# Patient Record
Sex: FEMALE | Race: WHITE | NOT HISPANIC OR LATINO | Employment: OTHER | URBAN - METROPOLITAN AREA
[De-identification: names, ages, dates, MRNs, and addresses within clinical notes are randomized per-mention and may not be internally consistent; named-entity substitution may affect disease eponyms.]

---

## 2017-02-02 ENCOUNTER — OFFICE VISIT (OUTPATIENT)
Dept: NEUROSURGERY | Facility: CLINIC | Age: 57
End: 2017-02-02

## 2017-02-02 VITALS
HEIGHT: 66 IN | SYSTOLIC BLOOD PRESSURE: 100 MMHG | TEMPERATURE: 97.7 F | BODY MASS INDEX: 21.05 KG/M2 | WEIGHT: 131 LBS | DIASTOLIC BLOOD PRESSURE: 60 MMHG

## 2017-02-02 DIAGNOSIS — G56.22 ULNAR NEUROPATHY OF LEFT UPPER EXTREMITY: Primary | ICD-10-CM

## 2017-02-02 PROCEDURE — 99214 OFFICE O/P EST MOD 30 MIN: CPT | Performed by: NEUROLOGICAL SURGERY

## 2017-02-02 NOTE — PATIENT INSTRUCTIONS
Call Dr. Maria on a Monday or Tuesday, and leave a message with Gloria ().   Dr. Maria will call you back at the end of the day as soon as he can.

## 2017-02-02 NOTE — PROGRESS NOTES
Jolanta CURTIS Mic  1960  [id number]      Chief Complaint   Patient presents with   • Arm Pain       HISTORY OF PRESENT ILLNESS:   This is a 56-year-old female who has a protracted history of pain in her left elbow unassociated with paresthesia in the distribution of the ulnar nerve.  She has pain when she drives her car when she holds an object in the elbow.  Pain also and numbness awakens her at night.  She has not had recent studies although in 2014 she had a EMG/NCV which was normal.  At that time I saw her for cervical issues.  She responded well to physical therapy.  The issues which she has at this time however are different in character and distribution.    Past Medical History   Diagnosis Date   • Anxiety    • B12 deficiency    • Blurred vision    • Difficulty swallowing solids    • Dyslipidemia    • Headache    • Hearing loss    • Hypotension, unspecified    • Insomnia    • Malaise and fatigue    • Multiple sprains    • Neck mass    • Sciatica    • Skin cancer    • Vitamin D deficiency        Past Surgical History   Procedure Laterality Date   • Mandible fracture surgery     • Mandible surgery     • Breast augmentation     • Tonsillectomy     • Augmentation mammaplasty  1998       Family History   Problem Relation Age of Onset   • Cancer Mother    • Diabetes Mother    • Heart disease Father        Social History     Social History   • Marital status:      Spouse name: N/A   • Number of children: N/A   • Years of education: N/A     Occupational History   • Not on file.     Social History Main Topics   • Smoking status: Never Smoker   • Smokeless tobacco: Never Used   • Alcohol use No   • Drug use: No   • Sexual activity: Defer     Other Topics Concern   • Not on file     Social History Narrative       Allergies   Allergen Reactions   • Codeine Nausea And Vomiting and Nausea Only         Current Outpatient Prescriptions:   •  diclofenac (VOLTAREN) 50 MG EC tablet, Take 1 tablet by mouth 3 (three)  times a day., Disp: 20 tablet, Rfl: 0  •  gabapentin (NEURONTIN) 300 MG capsule, Take 300 mg by mouth 3 (three) times a day., Disp: , Rfl:   •  HYDROcodone-acetaminophen (NORCO) 5-325 MG per tablet, Take 1 tablet by mouth every 6 (six) hours as needed., Disp: , Rfl:   •  LORazepam (ATIVAN) 0.5 MG tablet, Take 0.5 mg by mouth every 8 (eight) hours as needed for anxiety., Disp: , Rfl:   •  meloxicam (MOBIC) 7.5 MG tablet, Take 7.5 mg by mouth 2 (two) times a day., Disp: , Rfl:   •  ondansetron ODT (ZOFRAN-ODT) 4 MG disintegrating tablet, Take 4 mg by mouth every 8 (eight) hours as needed for nausea or vomiting., Disp: , Rfl:   •  promethazine (PHENERGAN) 25 MG tablet, Take 25 mg by mouth every 6 (six) hours as needed for nausea or vomiting., Disp: , Rfl:   •  SUMAtriptan (IMITREX) 50 MG tablet, Take one tablet at onset of headache. May repeat dose one time in 2 hours if headache not relieved., Disp: 10 tablet, Rfl: 0  •  zolpidem (AMBIEN) 5 MG tablet, Take 5 mg by mouth at night as needed for sleep., Disp: , Rfl:     Review of Systems   Constitutional: Negative for activity change, appetite change, chills, diaphoresis, fatigue, fever and unexpected weight change.   HENT: Negative for congestion, dental problem, drooling, ear discharge, ear pain, facial swelling, hearing loss, mouth sores, nosebleeds, postnasal drip, rhinorrhea, sinus pressure, sneezing, sore throat, tinnitus, trouble swallowing and voice change.    Eyes: Negative for photophobia, pain, discharge, redness, itching and visual disturbance.   Respiratory: Negative for apnea, cough, choking, chest tightness, shortness of breath, wheezing and stridor.    Cardiovascular: Negative for chest pain, palpitations and leg swelling.   Gastrointestinal: Negative for abdominal distention, abdominal pain, anal bleeding, blood in stool, constipation, diarrhea, nausea, rectal pain and vomiting.   Endocrine: Negative for cold intolerance, heat intolerance, polydipsia,  "polyphagia and polyuria.   Genitourinary: Negative for decreased urine volume, difficulty urinating, dysuria, enuresis, flank pain, frequency, genital sores, hematuria and urgency.   Musculoskeletal: Negative for arthralgias, back pain, gait problem, joint swelling, myalgias, neck pain and neck stiffness.   Skin: Negative for color change, pallor, rash and wound.   Allergic/Immunologic: Negative for environmental allergies, food allergies and immunocompromised state.   Neurological: Negative for dizziness, tremors, seizures, syncope, facial asymmetry, speech difficulty, weakness, light-headedness, numbness and headaches.   Hematological: Negative for adenopathy. Does not bruise/bleed easily.   Psychiatric/Behavioral: Negative for agitation, behavioral problems, confusion, decreased concentration, dysphoric mood, hallucinations, self-injury, sleep disturbance and suicidal ideas. The patient is not nervous/anxious and is not hyperactive.    All other systems reviewed and are negative.      Neurological Examination:    Vitals:    02/02/17 1635   BP: 100/60   BP Location: Right arm   Patient Position: Standing   Cuff Size: Adult   Temp: 97.7 °F (36.5 °C)   TempSrc: Temporal Artery    Weight: 131 lb (59.4 kg)   Height: 66\" (167.6 cm)       Mental status/speech: The patient is alert and oriented.  Speech is clear without aphysia or dysarthria.  No overt cognitive deficits.    Cranial nerve examination:    Olfaction: Smell is intact.  Vision: Vision is intact without visual field abnormalities.  Funduscopic examination is normal.  No pupillary irregularity.  Ocular motor examination: The extraocular muscles are intact.  There is no diplopia.  The pupil is round and reactive to both light and accommodation.  There is no nystagmus.  Facial movement/sensation: There is no facial weakness.  Sensation is intact in the first, second, and third divisions of the trigeminal nerve.  The corneal reflex is intact.  Auditory: Hearing " is intact to finger rub bilaterally.  Cranial nerves IX, X, XI, XII: Phonation is normal.  No dysphagia.  Tongue is protruded in the midline without atrophy.  The gag reflex is intact.  Shoulder shrug is normal.    Musculoligamentous ligamentous examination: She has full active range of motion of cervical spine.  She has a positive Tinel at the left elbow.  She has slight atrophy of the interosseous on the left side.  She has no weakness.  Her gait is normal.    Medical Decision Making:     Diagnostic Data Set:  She has had no recent diagnostic studies      Assessment:  Ulnar neuropathy          Recommendations:   I have ordered EMG/NCV of her left upper extremity and I've asked her to see the hand orthopedist in [place].  Whereas she may well have an ulnar neuropathy I'm concerned is since she has so much pain on the medial and lateral epicondyles of her elbow.  If she is to have surgery both may be approached at a single setting.  I'll me after her evaluation.        I greatly appreciate the opportunity to see and evaluate this individual.  If you have questions or concerns regarding issues that I may have overlooked please call me at any time: [phone].  Graham Maria M.D.  [facility]  [address].  [place] [address]    Scribed for Omid Maria MD by Matthew Paula CMA. 2/2/2017  5:05 PM     I have read and concur with the information provided by the scribe.  Omid Maria MD

## 2017-02-28 ENCOUNTER — HOSPITAL ENCOUNTER (OUTPATIENT)
Dept: NEUROLOGY | Facility: HOSPITAL | Age: 57
Discharge: HOME | End: 2017-02-28
Attending: NEUROLOGICAL SURGERY | Admitting: NEUROLOGICAL SURGERY

## 2017-02-28 ENCOUNTER — TELEPHONE (OUTPATIENT)
Dept: NEUROSURGERY | Facility: CLINIC | Age: 57
End: 2017-02-28

## 2017-02-28 PROCEDURE — 95886 MUSC TEST DONE W/N TEST COMP: CPT

## 2017-02-28 PROCEDURE — 95908 NRV CNDJ TST 3-4 STUDIES: CPT

## 2017-02-28 NOTE — TELEPHONE ENCOUNTER
----- Message from Gloria KYMBERLY Puente sent at 2/28/2017  4:41 PM EST -----  Contact: [phone]  PATIENT CALLING TO REPORT RESULTS OF OFFICE VISITS WITH DR. MONTEIRO AND DR. JOHN.  DR. MONTEIRO ULNAR NEUROPATHY IN LEFT ELBOW MILD TO MODERATE, MEDIAN NEUROPATHY AT LEFT WRIST IS MILD.  NO RADICULOPATHY.      SHE WAS GIVEN A WRIST SPLINT AND AN ELBOW EXTENSION BRACE.      PATIENT IS IN EPIC.

## 2017-03-02 ENCOUNTER — APPOINTMENT (OUTPATIENT)
Dept: NEUROLOGY | Facility: HOSPITAL | Age: 57
End: 2017-03-02
Attending: NEUROLOGICAL SURGERY

## 2017-04-06 ENCOUNTER — TELEPHONE (OUTPATIENT)
Dept: NEUROSURGERY | Facility: CLINIC | Age: 57
End: 2017-04-06

## 2017-04-06 NOTE — TELEPHONE ENCOUNTER
----- Message from Gloria Puente sent at 4/5/2017  1:57 PM EDT -----  Contact: [phone]  PATIENT CALLING TO REPORT ON HER CONDITION.  LAST SEEN IN February.  HAS BEEN WEARING BRACES AND IT HAS CALMED HER ELBOW, BUT HER LEFT WRIST HAS PAIN THAT GOES UP HER ARM AND PAIN IN HER FINGERS.    GOING TO [place] AND [place] IN [place].      PATIENT IS IN EPIC.

## 2017-05-18 ENCOUNTER — TELEPHONE (OUTPATIENT)
Dept: NEUROSURGERY | Facility: CLINIC | Age: 57
End: 2017-05-18

## 2017-08-02 ENCOUNTER — TRANSCRIBE ORDERS (OUTPATIENT)
Dept: PHYSICAL THERAPY | Facility: HOSPITAL | Age: 57
End: 2017-08-02

## 2017-08-02 ENCOUNTER — HOSPITAL ENCOUNTER (OUTPATIENT)
Dept: PHYSICAL THERAPY | Facility: HOSPITAL | Age: 57
Setting detail: THERAPIES SERIES
Discharge: HOME | End: 2017-08-02

## 2017-08-02 DIAGNOSIS — M25.552 CHRONIC HIP PAIN, LEFT: Primary | ICD-10-CM

## 2017-08-02 DIAGNOSIS — G89.29 CHRONIC HIP PAIN, LEFT: Primary | ICD-10-CM

## 2017-08-02 DIAGNOSIS — M25.552 LEFT HIP PAIN: Primary | ICD-10-CM

## 2017-08-02 PROCEDURE — G8978 MOBILITY CURRENT STATUS: HCPCS

## 2017-08-02 PROCEDURE — 97163 PT EVAL HIGH COMPLEX 45 MIN: CPT

## 2017-08-02 PROCEDURE — G8979 MOBILITY GOAL STATUS: HCPCS

## 2017-08-08 ENCOUNTER — HOSPITAL ENCOUNTER (OUTPATIENT)
Dept: PHYSICAL THERAPY | Facility: HOSPITAL | Age: 57
Setting detail: THERAPIES SERIES
Discharge: HOME | End: 2017-08-08
Attending: INTERNAL MEDICINE

## 2017-08-08 DIAGNOSIS — G89.29 CHRONIC HIP PAIN, LEFT: Primary | ICD-10-CM

## 2017-08-08 DIAGNOSIS — M25.552 CHRONIC HIP PAIN, LEFT: Primary | ICD-10-CM

## 2017-08-08 PROCEDURE — G0283 ELEC STIM OTHER THAN WOUND: HCPCS

## 2017-08-08 PROCEDURE — 97110 THERAPEUTIC EXERCISES: CPT

## 2017-08-08 PROCEDURE — 97035 APP MDLTY 1+ULTRASOUND EA 15: CPT

## 2017-08-08 NOTE — THERAPY TREATMENT NOTE
Outpatient Physical Therapy Ortho Treatment Note  ANDREA Shrestha     Patient Name: Jolanta Haile  : 1960  MRN: [id number]  Today's Date: 2017      Visit Date: 2017    Visit Dx:    ICD-10-CM ICD-9-CM   1. Chronic hip pain, left M25.552 719.45    G89.29 338.29       Patient Active Problem List   Diagnosis   • Pain of left breast   • Visit for wound check        Past Medical History:   Diagnosis Date   • Anxiety    • B12 deficiency    • Blurred vision    • Difficulty swallowing solids    • Dyslipidemia    • Headache    • Hearing loss    • Hypotension, unspecified    • Insomnia    • Malaise and fatigue    • Multiple sprains    • Neck mass    • Sciatica    • Skin cancer    • Vitamin D deficiency         Past Surgical History:   Procedure Laterality Date   • AUGMENTATION MAMMAPLASTY     • BREAST AUGMENTATION     • MANDIBLE FRACTURE SURGERY     • MANDIBLE SURGERY     • TONSILLECTOMY                               PT Assessment/Plan       17 1228       PT Assessment    Assessment Comments Tx consisted of mh and estim to back followed by ther ex and ended with US to left hip flexor.  Pt reported increased  pain with most left hip exercises and reported similar post pain scores.  -RT     PT Plan    PT Plan Comments Will follow for decreased hip pain.  -RT       User Key  (r) = Recorded By, (t) = Taken By, (c) = Cosigned By    Initials Name Provider Type    RT Alexsander Littlejohn, PT Physical Therapist                Modalities       17 1100          Subjective Comments    Subjective Comments Pt reports having increased left hip flexor and lbp today.  Pt reports performing her HEP.  -RT      Subjective Pain    Able to rate subjective pain? yes  -RT      Pre-Treatment Pain Level 7  -RT      Post-Treatment Pain Level 6  -RT      Moist Heat    MH Applied Yes  -RT      Location low back and lefft hip  -RT      Rx Minutes 10 mins  -RT      MH Prior to Rx Yes  -RT      Ultrasound 56761    Location left  hip flexor  -RT      Rx Minutes 10min  -RT      Duty Cycle 50  -RT      Frequency --   3.3  -RT      Intensity - Wts/cm 1.2  -RT      ELECTRICAL STIMULATION    Attended/Unattended Unattended  -RT      Stimulation Type Pre-Mod  -RT      Location/Electrode Placement/Other L5-S1  -RT      Rx Minutes 10 mins  -RT        User Key  (r) = Recorded By, (t) = Taken By, (c) = Cosigned By    Initials Name Provider Type    RT Alexsander Littlejohn PT Physical Therapist                Exercises       08/08/17 1100          Subjective Comments    Subjective Comments Pt reports having increased left hip flexor and lbp today.  Pt reports performing her HEP.  -RT      Subjective Pain    Able to rate subjective pain? yes  -RT      Pre-Treatment Pain Level 7  -RT      Post-Treatment Pain Level 6  -RT      Exercise 1    Exercise Name 1 ltr x30, ppe x10, hip flexor stretch manual, piriformis s [id number], ham s 20sec x3, dktc 20s x3, scap squeeze x20, slr x10, ball squeeze x20, saq x20, ppt x10, hip abd rtb x10,   -RT      Cueing 1 Verbal  -RT      Time (Seconds) 1 30  -RT        User Key  (r) = Recorded By, (t) = Taken By, (c) = Cosigned By    Initials Name Provider Type    RT Alexsander Littlejohn PT Physical Therapist                                   Therapy Education       08/08/17 1228          Therapy Education    Given HEP;Symptoms/condition management;Pain management;Posture/body mechanics  -RT      Program Reinforced  -RT      How Provided Verbal;Demonstration  -RT      Provided to Patient  -RT      Level of Understanding Teach back education performed;Verbalized;Demonstrated  -RT        User Key  (r) = Recorded By, (t) = Taken By, (c) = Cosigned By    Initials Name Provider Type    RT Alexsander Littlejohn PT Physical Therapist                Time Calculation:   Start Time: 1000  Stop Time: 1058  Time Calculation (min): 58 min    Therapy Charges for Today     Code Description Service Date Service Provider Modifiers Qty    03794992067  PT  THER PROC EA 15 MIN 8/8/2017 Alexsander Littlejohn, PT GP 2    95015168894 HC PT ELECTRICAL STIM UNATTENDED 8/8/2017 Alexsander Littlejohn, PT  1    45501141156 HC PT ULTRASOUND EA 15 MIN 8/8/2017 Alexsander Littlejohn, PT GP 1                    Alexsander Littlejohn, PT  8/8/2017

## 2017-08-11 ENCOUNTER — HOSPITAL ENCOUNTER (OUTPATIENT)
Dept: PHYSICAL THERAPY | Facility: HOSPITAL | Age: 57
Setting detail: THERAPIES SERIES
Discharge: HOME | End: 2017-08-11
Attending: INTERNAL MEDICINE

## 2017-08-11 DIAGNOSIS — G89.29 CHRONIC HIP PAIN, LEFT: Primary | ICD-10-CM

## 2017-08-11 DIAGNOSIS — M25.552 CHRONIC HIP PAIN, LEFT: Primary | ICD-10-CM

## 2017-08-11 PROCEDURE — 97110 THERAPEUTIC EXERCISES: CPT

## 2017-08-11 PROCEDURE — G0283 ELEC STIM OTHER THAN WOUND: HCPCS

## 2017-08-11 PROCEDURE — 97140 MANUAL THERAPY 1/> REGIONS: CPT

## 2017-08-11 NOTE — THERAPY TREATMENT NOTE
Outpatient Physical Therapy Ortho Treatment Note   Fady     Patient Name: Jolanta Haile  : 1960  MRN: [id number]  Today's Date: 2017      Visit Date: 2017    Visit Dx:    ICD-10-CM ICD-9-CM   1. Chronic hip pain, left M25.552 719.45    G89.29 338.29       Patient Active Problem List   Diagnosis   • Pain of left breast   • Visit for wound check        Past Medical History:   Diagnosis Date   • Anxiety    • B12 deficiency    • Blurred vision    • Difficulty swallowing solids    • Dyslipidemia    • Headache    • Hearing loss    • Hypotension, unspecified    • Insomnia    • Malaise and fatigue    • Multiple sprains    • Neck mass    • Sciatica    • Skin cancer    • Vitamin D deficiency         Past Surgical History:   Procedure Laterality Date   • AUGMENTATION MAMMAPLASTY     • BREAST AUGMENTATION     • MANDIBLE FRACTURE SURGERY     • MANDIBLE SURGERY     • TONSILLECTOMY               PT Ortho       17 0900    Subjective Comments    Subjective Comments Patient reports she is sore this morning from performing her stretches, 8/10 pain prior to tx.   -FOUZIA    Subjective Pain    Able to rate subjective pain? yes  -FOUZIA    Pre-Treatment Pain Level 8  -FOUZIA      User Key  (r) = Recorded By, (t) = Taken By, (c) = Cosigned By    Initials Name Provider Type    FOUZIA Mercado PTA Physical Therapy Assistant                            PT Assessment/Plan       17 1153       PT Assessment    Assessment Comments Patient presents to therapy w/ reports of increased soreness secondary performing stretches at home.  Pt initiated today's session w/ MH and Estim for pain control f/b therex as per written flow sheet.  Treatment concluded with manual STM to low back and L) hip musculature to address tightness and for pain control, as to pt's tolerance.  Tenderness noted along L) greater trochanter region.  Pt received cues throughout session improved feedback and for max benefit w/ activities.   "Pt will be progressed as tolerated, w/ no adverse reactions observed following modalities/ manual therapy.    -FOUZIA     PT Plan    PT Plan Comments Continue with PT's POC and progress tx as tolerated by patient.   -FOUZIA       User Key  (r) = Recorded By, (t) = Taken By, (c) = Cosigned By    Initials Name Provider Type    FOUZIA Mercado PTA Physical Therapy Assistant                Modalities       08/11/17 0900          Moist Heat    MH Applied Yes  -FOUZIA      Location low back and lefft hip  -FOUZIA      Rx Minutes 10 mins  -FOUZIA      MH Prior to Rx Yes   w/ estim in supine position  -FOUZIA      ELECTRICAL STIMULATION    Attended/Unattended Unattended  -FOUZIA      Stimulation Type Pre-Mod  -FOUZIA      Max mAmp --   as to pt's tolerance  -FOUZIA      Location/Electrode Placement/Other L5-S1  -FOUZIA      Rx Minutes 10 mins  -FOUZIA        User Key  (r) = Recorded By, (t) = Taken By, (c) = Cosigned By    Initials Name Provider Type    FOUZIA Mercado PTA Physical Therapy Assistant                Exercises       08/11/17 0900          Subjective Comments    Subjective Comments Patient reports she is sore this morning from performing her stretches, 8/10 pain prior to tx.   -FOUZIA      Subjective Pain    Able to rate subjective pain? yes  -FOUZIA      Pre-Treatment Pain Level 8  -FOUZIA      Post-Treatment Pain Level 6  -FOUZIA      Exercise 1    Exercise Name 1 LTR 15x2, PPT 15x2, piriformis stretch 3x20\", DKTC 3x20\", ball squeeze 15x2, hip abd w/tband (red) 10x2, SAQ 15x2, seated march x10, scap squeeze 10x2  -FOUZIA      Cueing 1 Verbal;Tactile;Demo  -FOUZIA      Time (Minutes) 1 30 min  -FOUZIA        User Key  (r) = Recorded By, (t) = Taken By, (c) = Cosigned By    Initials Name Provider Type    FOUZIA Mercado PTA Physical Therapy Assistant                        Manual Rx (last 36 hours)      Manual Treatments       08/11/17 1100          Manual Rx 1    Manual Rx 1 Location lumbar/ L) hip musculature  -FOUZIA      Manual Rx 1 Type STM  -FOUZIA      " Manual Rx 1 Grade gentle, as to pt's tolerance  -FOUZIA      Manual Rx 1 Duration 10 min  -FOUZIA        User Key  (r) = Recorded By, (t) = Taken By, (c) = Cosigned By    Initials Name Provider Type    FOUZIA Mercado PTA Physical Therapy Assistant                    Therapy Education       08/11/17 0905          Therapy Education    Given HEP;Symptoms/condition management;Pain management;Posture/body mechanics  -FOUZIA      Program Reinforced  -FOUZIA      How Provided Verbal;Demonstration  -FOUZIA      Provided to Patient  -FOUZIA      Level of Understanding Verbalized;Demonstrated  -FOUZIA        User Key  (r) = Recorded By, (t) = Taken By, (c) = Cosigned By    Initials Name Provider Type    FOUZIA Mercado PTA Physical Therapy Assistant                Time Calculation:   Start Time: 0855  Stop Time: 0950  Time Calculation (min): 55 min    Therapy Charges for Today     Code Description Service Date Service Provider Modifiers Qty    99922792732 HC PT THER PROC EA 15 MIN 8/11/2017 Misty Mercado PTA GP 2    36360681222 HC PT MANUAL THERAPY EA 15 MIN 8/11/2017 Misty Mercado PTA GP 1    29612180651 HC PT ELECTRICAL STIM UNATTENDED 8/11/2017 Misty Mercado PTA  1                    Misty Mercado PTA  8/11/2017

## 2017-08-15 ENCOUNTER — HOSPITAL ENCOUNTER (OUTPATIENT)
Dept: PHYSICAL THERAPY | Facility: HOSPITAL | Age: 57
Setting detail: THERAPIES SERIES
Discharge: HOME | End: 2017-08-15
Attending: INTERNAL MEDICINE

## 2017-08-15 DIAGNOSIS — G89.29 CHRONIC HIP PAIN, LEFT: Primary | ICD-10-CM

## 2017-08-15 DIAGNOSIS — M25.552 CHRONIC HIP PAIN, LEFT: Primary | ICD-10-CM

## 2017-08-15 PROCEDURE — G0283 ELEC STIM OTHER THAN WOUND: HCPCS

## 2017-08-15 PROCEDURE — 97110 THERAPEUTIC EXERCISES: CPT

## 2017-08-15 PROCEDURE — 97140 MANUAL THERAPY 1/> REGIONS: CPT

## 2017-08-15 NOTE — THERAPY TREATMENT NOTE
Outpatient Physical Therapy Ortho Treatment Note  ANDREA Shrestha     Patient Name: Jolanta Haile  : 1960  MRN: [id number]  Today's Date: 8/15/2017      Visit Date: 08/15/2017    Visit Dx:    ICD-10-CM ICD-9-CM   1. Chronic hip pain, left M25.552 719.45    G89.29 338.29       Patient Active Problem List   Diagnosis   • Pain of left breast   • Visit for wound check        Past Medical History:   Diagnosis Date   • Anxiety    • B12 deficiency    • Blurred vision    • Difficulty swallowing solids    • Dyslipidemia    • Headache    • Hearing loss    • Hypotension, unspecified    • Insomnia    • Malaise and fatigue    • Multiple sprains    • Neck mass    • Sciatica    • Skin cancer    • Vitamin D deficiency         Past Surgical History:   Procedure Laterality Date   • AUGMENTATION MAMMAPLASTY     • BREAST AUGMENTATION     • MANDIBLE FRACTURE SURGERY     • MANDIBLE SURGERY     • TONSILLECTOMY               PT Ortho       08/15/17 1000    Subjective Comments    Subjective Comments Patient states that she has been working on her exercises at home and walking as well. Patient reports that she continues to have pain the L) hip and low back.  -AC    Subjective Pain    Able to rate subjective pain? yes  -AC    Pre-Treatment Pain Level 6  -AC    Post-Treatment Pain Level 6  -AC      User Key  (r) = Recorded By, (t) = Taken By, (c) = Cosigned By    Initials Name Provider Type    CLAUDINE Garza PTA Physical Therapy Assistant                            PT Assessment/Plan       08/15/17 1030       PT Assessment    Assessment Comments Patient tolerated treatment session well with rest breaks taken as needed by the patient. Educated patient to perform ther ex per her tolerance, patient verbalized understanding. STM perform to the L) hip/ lumbar musculature to help decrease tightness in the low back musculature. No adverse reactions with modalities or treatment. Decrease in pain noted following treatment.   -AC   "   PT Plan    PT Plan Comments Continue per PT's POC, progress per the patient's tolerance.  -AC       User Key  (r) = Recorded By, (t) = Taken By, (c) = Cosigned By    Initials Name Provider Type    CLAUDINE Garza PTA Physical Therapy Assistant                Modalities       08/15/17 1000          Moist Heat    MH Applied Yes   No redness noted following moist heat  -AC      Location low back and left hip  -AC      Rx Minutes 15 mins  -AC      MH Prior to Rx Yes  -AC      ELECTRICAL STIMULATION    Attended/Unattended Unattended   No irritation noted following estim  -AC      Stimulation Type Pre-Mod  -AC      Max mAmp --   per the patient's tolerance  -AC      Location/Electrode Placement/Other L5-S1  -AC      Rx Minutes 15 mins  -AC        User Key  (r) = Recorded By, (t) = Taken By, (c) = Cosigned By    Initials Name Provider Type    CLAUDINE Garza PTA Physical Therapy Assistant                Exercises       08/15/17 1000          Subjective Comments    Subjective Comments Patient states that she has been working on her exercises at home and walking as well. Patient reports that she continues to have pain the L) hip and low back.  -AC      Subjective Pain    Able to rate subjective pain? yes  -AC      Pre-Treatment Pain Level 6  -AC      Post-Treatment Pain Level 6  -AC      Exercise 1    Exercise Name 1 LTR 15x2, PPT 15x2, piriformis stretch 3x20\", DKTC 3x20\", ball squeeze 15x2, hip abd w/tband (red) 10x2, SAQ 15x2, seated march x10, scap squeeze 10x2  -AC      Cueing 1 Verbal;Tactile;Demo  -AC      Time (Minutes) 1 30 minutes  -AC        User Key  (r) = Recorded By, (t) = Taken By, (c) = Cosigned By    Initials Name Provider Type    CLAUDINE Garza PTA Physical Therapy Assistant                        Manual Rx (last 36 hours)      Manual Treatments       08/15/17 0900          Manual Rx 1    Manual Rx 1 Location lumbar/ L) hip musculature  -AC      Manual Rx 1 Type STM  -AC   "    Manual Rx 1 Grade gentle, as to pt's tolerance  -AC      Manual Rx 1 Duration 10 min  -AC        User Key  (r) = Recorded By, (t) = Taken By, (c) = Cosigned By    Initials Name Provider Type    CLAUDINE Garza PTA Physical Therapy Assistant                    Therapy Education       08/15/17 1030          Therapy Education    Given HEP;Symptoms/condition management;Pain management  -AC      Program Reinforced  -AC      How Provided Verbal;Demonstration  -AC      Provided to Patient  -AC      Level of Understanding Verbalized;Demonstrated  -AC        User Key  (r) = Recorded By, (t) = Taken By, (c) = Cosigned By    Initials Name Provider Type    CLAUDINE Garza PTA Physical Therapy Assistant                Time Calculation:   Start Time: 0905  Stop Time: 1005  Time Calculation (min): 60 min    Therapy Charges for Today     Code Description Service Date Service Provider Modifiers Qty    60798853221 HC PT THER PROC EA 15 MIN 8/15/2017 Jen Garza PTA GP 2    28396388228 HC PT MANUAL THERAPY EA 15 MIN 8/15/2017 Jen Garza PTA GP 1    37012355768 HC PT ELECTRICAL STIM UNATTENDED 8/15/2017 Jen Garza PTA  1    26343450463 HC PT THER SUPP EA 15 MIN 8/15/2017 Jen Garza PTA GP 1                    Jen Garza PTA  8/15/2017

## 2017-08-18 ENCOUNTER — HOSPITAL ENCOUNTER (OUTPATIENT)
Dept: PHYSICAL THERAPY | Facility: HOSPITAL | Age: 57
Setting detail: THERAPIES SERIES
Discharge: HOME | End: 2017-08-18
Attending: INTERNAL MEDICINE

## 2017-08-18 DIAGNOSIS — M25.552 CHRONIC HIP PAIN, LEFT: Primary | ICD-10-CM

## 2017-08-18 DIAGNOSIS — G89.29 CHRONIC HIP PAIN, LEFT: Primary | ICD-10-CM

## 2017-08-18 PROCEDURE — 97140 MANUAL THERAPY 1/> REGIONS: CPT

## 2017-08-18 PROCEDURE — G0283 ELEC STIM OTHER THAN WOUND: HCPCS

## 2017-08-18 PROCEDURE — 97110 THERAPEUTIC EXERCISES: CPT

## 2017-08-18 NOTE — THERAPY TREATMENT NOTE
Outpatient Physical Therapy Ortho Treatment Note  ANDREA Shrestha     Patient Name: Jolanta Haile  : 1960  MRN: [id number]  Today's Date: 2017      Visit Date: 2017    Visit Dx:    ICD-10-CM ICD-9-CM   1. Chronic hip pain, left M25.552 719.45    G89.29 338.29       Patient Active Problem List   Diagnosis   • Pain of left breast   • Visit for wound check        Past Medical History:   Diagnosis Date   • Anxiety    • B12 deficiency    • Blurred vision    • Difficulty swallowing solids    • Dyslipidemia    • Headache    • Hearing loss    • Hypotension, unspecified    • Insomnia    • Malaise and fatigue    • Multiple sprains    • Neck mass    • Sciatica    • Skin cancer    • Vitamin D deficiency         Past Surgical History:   Procedure Laterality Date   • AUGMENTATION MAMMAPLASTY     • BREAST AUGMENTATION     • MANDIBLE FRACTURE SURGERY     • MANDIBLE SURGERY     • TONSILLECTOMY               PT Ortho       17 1000    Subjective Comments    Subjective Comments Patient reports that she is getting stronger and is able to walk better. Patient states that she has been working on her home exercises and walking more.   -AC    Subjective Pain    Able to rate subjective pain? yes  -AC    Pre-Treatment Pain Level 5  -AC    Post-Treatment Pain Level 4  -AC      User Key  (r) = Recorded By, (t) = Taken By, (c) = Cosigned By    Initials Name Provider Type    CLAUDINE Garza, PTA Physical Therapy Assistant                            PT Assessment/Plan       17 1015       PT Assessment    Assessment Comments New ther ex added per the patient's tolerance, patient demonstrated and understood new ther ex with no increase in pain noted. Patient tolerated treatment session well with rest breaks taken as needed by the patient. Reps increased per the patient's tolerance with no increase in pain. Educated patient to perform ther ex per her tolerance, patient verbalized understanding. Patient  "educated on performing bridges x10 at home per her tolerance, educated patient to discontinue exercises if increased pain or irritation occurs, patient verbalized understanding. No adverse reactions with modalities or treatment. Decrease in pain noted following treatment session to 4/10. STM performed to help decrease tightness in the L) hip and low back musculature.  -AC     PT Plan    PT Plan Comments Continue per PT's POC, progress per the patient's tolerance.  -AC       User Key  (r) = Recorded By, (t) = Taken By, (c) = Cosigned By    Initials Name Provider Type    CLAUDINE aGrza PTA Physical Therapy Assistant                Modalities       08/18/17 1000          Moist Heat    MH Applied Yes   No redness noted following moist heat  -AC      Location low back and left hip   with estim  -AC      Rx Minutes 15 mins  -AC      MH Prior to Rx Yes  -AC      ELECTRICAL STIMULATION    Attended/Unattended Unattended   No irritation noted following estim  -AC      Stimulation Type Pre-Mod  -AC      Max mAmp --   per the patient's tolerance  -AC      Location/Electrode Placement/Other L5-S1  -AC      Rx Minutes 15 mins  -AC        User Key  (r) = Recorded By, (t) = Taken By, (c) = Cosigned By    Initials Name Provider Type    CLAUDINE Garza PTA Physical Therapy Assistant                Exercises       08/18/17 1000          Subjective Comments    Subjective Comments Patient reports that she is getting stronger and is able to walker better. Patient states that she has been working on her home exercises and walking more.   -AC      Subjective Pain    Able to rate subjective pain? yes  -AC      Pre-Treatment Pain Level 5  -AC      Post-Treatment Pain Level 4  -AC      Exercise 1    Exercise Name 1 LTR 15x2, PPT 15x2, piriformis stretch 3x20\", DKTC 3x20\", ball squeeze 15x2, hip abd w/tband (red) 10x2, SAQ 15x2, seated march x15, scap squeeze 10x2, SLR x15, bridge x10  -AC      Cueing 1 Verbal;Tactile;Demo "  -AC      Time (Minutes) 1 30 minutes  -AC        User Key  (r) = Recorded By, (t) = Taken By, (c) = Cosigned By    Initials Name Provider Type     Jen Garza PTA Physical Therapy Assistant                        Manual Rx (last 36 hours)      Manual Treatments       08/18/17 0900          Manual Rx 1    Manual Rx 1 Location lumbar/ L) hip musculature  -AC      Manual Rx 1 Type STM  -AC      Manual Rx 1 Grade gentle, as to pt's tolerance  -AC      Manual Rx 1 Duration 10 min  -AC        User Key  (r) = Recorded By, (t) = Taken By, (c) = Cosigned By    Initials Name Provider Type     Jen Garza PTA Physical Therapy Assistant                    Therapy Education       08/18/17 1015          Therapy Education    Given HEP;Symptoms/condition management;Pain management;Posture/body mechanics  -AC      Program Progressed  -AC      How Provided Verbal;Demonstration  -AC      Provided to Patient  -AC      Level of Understanding Verbalized;Demonstrated  -AC        User Key  (r) = Recorded By, (t) = Taken By, (c) = Cosigned By    Initials Name Provider Type     Jen Garza PTA Physical Therapy Assistant                Time Calculation:   Start Time: 0900  Stop Time: 1005  Time Calculation (min): 65 min    Therapy Charges for Today     Code Description Service Date Service Provider Modifiers Qty    24029384579 HC PT THER PROC EA 15 MIN 8/18/2017 Jen Garza PTA GP 2    26749944744 HC PT ELECTRICAL STIM UNATTENDED 8/18/2017 Jen Garza PTA  1    57510362451 HC PT MANUAL THERAPY EA 15 MIN 8/18/2017 Jen Garza PTA GP 1    83147043625 HC PT THER SUPP EA 15 MIN 8/18/2017 Jen Garza PTA GP 1                    Jen Garza PTA  8/18/2017

## 2017-08-22 ENCOUNTER — HOSPITAL ENCOUNTER (OUTPATIENT)
Dept: PHYSICAL THERAPY | Facility: HOSPITAL | Age: 57
Setting detail: THERAPIES SERIES
Discharge: HOME | End: 2017-08-22
Attending: INTERNAL MEDICINE

## 2017-08-22 DIAGNOSIS — M25.552 CHRONIC HIP PAIN, LEFT: Primary | ICD-10-CM

## 2017-08-22 DIAGNOSIS — G89.29 CHRONIC HIP PAIN, LEFT: Primary | ICD-10-CM

## 2017-08-22 PROCEDURE — 97110 THERAPEUTIC EXERCISES: CPT | Performed by: PHYSICAL THERAPIST

## 2017-08-22 PROCEDURE — G0283 ELEC STIM OTHER THAN WOUND: HCPCS | Performed by: PHYSICAL THERAPIST

## 2017-08-22 NOTE — THERAPY DISCHARGE NOTE
Outpatient Physical Therapy Ortho Treatment Note/Discharge Summary  ANDREA Shrestha     Patient Name: Jolanta Halie  : 1960  MRN: [id number]  Today's Date: 2017      Visit Date: 2017    Visit Dx:    ICD-10-CM ICD-9-CM   1. Chronic hip pain, left M25.552 719.45    G89.29 338.29       Patient Active Problem List   Diagnosis   • Pain of left breast   • Visit for wound check        Past Medical History:   Diagnosis Date   • Anxiety    • B12 deficiency    • Blurred vision    • Difficulty swallowing solids    • Dyslipidemia    • Headache    • Hearing loss    • Hypotension, unspecified    • Insomnia    • Malaise and fatigue    • Multiple sprains    • Neck mass    • Sciatica    • Skin cancer    • Vitamin D deficiency         Past Surgical History:   Procedure Laterality Date   • AUGMENTATION MAMMAPLASTY     • BREAST AUGMENTATION     • MANDIBLE FRACTURE SURGERY     • MANDIBLE SURGERY     • TONSILLECTOMY               PT Ortho       17 1000    Subjective Comments    Subjective Comments Patient reports that her hip pain has improved with therapy.  She notes that she performs her HEP almost daily.  -BE    Subjective Pain    Able to rate subjective pain? yes  -BE    Pre-Treatment Pain Level 8  -BE    Post-Treatment Pain Level 7  -BE    Myotomal Screen- Lower Quarter Clearing    Hip flexion (L2) Bilateral:;4- (Good -)  -BE    Knee extension (L3) Bilateral:;4- (Good -)  -BE    Knee flexion (S2) Left:;4- (Good -);Right:;4 (Good)  -BE      User Key  (r) = Recorded By, (t) = Taken By, (c) = Cosigned By    Initials Name Provider Type    BE Ann Marie Trammell, PT Physical Therapist                                Modalities       17 1000          Moist Heat    MH Applied Yes   No redness following MH  -BE      Location low back and left hip  -BE      Rx Minutes 15 mins  -BE      MH Prior to Rx Yes   with ESTIM in supine position  -BE      ELECTRICAL STIMULATION    Attended/Unattended Unattended   No  "skin irritation following ESTIM  -BE      Stimulation Type Pre-Mod  -BE      Max mAmp --   per patient's tolerance  -BE      Location/Electrode Placement/Other L5-S1  -BE      Rx Minutes 15 mins  -BE        User Key  (r) = Recorded By, (t) = Taken By, (c) = Cosigned By    Initials Name Provider Type    BE Ann Marie Trammell, PT Physical Therapist                Exercises       08/22/17 1000          Subjective Comments    Subjective Comments Patient reports that her hip pain has improved with therapy.  She notes that she performs her HEP almost daily.  -BE      Subjective Pain    Able to rate subjective pain? yes  -BE      Pre-Treatment Pain Level 8  -BE      Post-Treatment Pain Level 7  -BE      Exercise 1    Exercise Name 1 LTR 15x2, PPT 15x2, piriformis stretch 3x20\", SKTC 3x20\",  DKTC 3x20\", scap squeeze 10x2, SLR x15, bridge x10  -BE      Cueing 1 Verbal;Tactile;Demo  -BE      Time (Minutes) 1 25 min  -BE        User Key  (r) = Recorded By, (t) = Taken By, (c) = Cosigned By    Initials Name Provider Type    BE Ann Marie Trammell, PT Physical Therapist                               PT OP Goals       08/22/17 1000       PT Short Term Goals    STG 1 Pt will be instructed in a HEP.  -BE     STG 1 Progress Met  -BE     STG 2 Pt will demonstrate bilateral LE groos strength 4-/5 or greater.  -BE     STG 2 Progress Met  -BE     STG 3 Pt will be able to sit and stand for 10min without increased pain.  -BE     STG 3 Progress Met  -BE     Long Term Goals    LTG 1 Pt will report worst pain to be no greater than 7/10.  -BE     LTG 1 Progress Not Met  -BE     LTG 2 Pt will be instructed on a LE and core stabilization program.  -BE     LTG 2 Progress Met  -BE     LTG 3 Pt will improve her LEFS to 35 or greater.  -BE     LTG 3 Progress Not Met  -BE       User Key  (r) = Recorded By, (t) = Taken By, (c) = Cosigned By    Initials Name Provider Type    BE Ann Marie Trammell, PT Physical Therapist                Therapy Education  "      08/22/17 1037          Therapy Education    Given HEP;Symptoms/condition management;Pain management;Posture/body mechanics  -BE      Program Reinforced  -BE      How Provided Verbal;Demonstration;Written  -BE      Provided to Patient  -BE      Level of Understanding Demonstrated;Verbalized  -BE        User Key  (r) = Recorded By, (t) = Taken By, (c) = Cosigned By    Initials Name Provider Type    BE Ann Marie Trammell, PT Physical Therapist                Outcome Measures       08/22/17 1000          Lower Extremity Functional Index    Any of your usual work, housework or school activities 1  -BE      Your usual hobbies, recreational or sporting activities 1  -BE      Getting into or out of the bath 3  -BE      Walking between rooms 3  -BE      Putting on your shoes or socks 3  -BE      Squatting 2  -BE      Lifting an object, like a bag of groceries from the floor 2  -BE      Performing light activities around your home 2  -BE      Performing heavy activities around your home 0  -BE      Getting into or out of a car 3  -BE      Walking 2 blocks 3  -BE      Walking a mile 3  -BE      Going up or down 10 stairs (about 1 flight of stairs) 2  -BE      Standing for 1 hour 0  -BE      Sitting for 1 hour 0  -BE      Running on even ground 0  -BE      Running on uneven ground 0  -BE      Making sharp turns while running fast 0  -BE      Hopping 0  -BE      Rolling over in bed 2  -BE      Total 30  -BE      Functional Assessment    Outcome Measure Options Lower Extremity Functional Scale (LEFS)  -BE        User Key  (r) = Recorded By, (t) = Taken By, (c) = Cosigned By    Initials Name Provider Type    BE Ann Marie Trammell, PT Physical Therapist            Time Calculation:   Start Time: 0905  Stop Time: 0952  Time Calculation (min): 47 min    Therapy Charges for Today     Code Description Service Date Service Provider Modifiers Qty    40783727885  PT ELECTRICAL STIM UNATTENDED 8/22/2017 Ann Marie Trammell, PT  1     [id number]  PT THER PROC EA 15 MIN 8/22/2017 Ann Marie Trammell, PT GP 2          PT G-Codes  Outcome Measure Options: Lower Extremity Functional Scale (LEFS)     OP PT Discharge Summary  Date of Discharge: 08/22/17  Reason for Discharge: Maximum functional potential achieved  Outcomes Achieved: Refer to plan of care for updates on goals achieved  Discharge Destination: Home with home program (Encouraged to follow-up with referring physician.)  Discharge Instructions: Patient to be discharged, due to achieving maximum level of function at this time.  Patient has improved in LE strength, but continues to report elevated pain levels.  She notes 5/10 pain at best and 9/10 pain at worst.  She reports that standing/sitting tolerance has improved to a 15 minute tolerance.  Patient reports improved functional mobility, with LEFS score improving to 30/80.  She was provided with a HEP while attending therapy and reports compliance with HEP.  Patient is encouraged to continue with HEP following discharge from PT.  Patient was recommended to follow-up with referring physician regarding continued elevated pain reports and notes that she has an appointment on 8/31.      Ann Marie Trammell, PT  8/22/2017

## 2017-09-01 ENCOUNTER — APPOINTMENT (OUTPATIENT)
Dept: PHYSICAL THERAPY | Facility: HOSPITAL | Age: 57
End: 2017-09-01
Attending: INTERNAL MEDICINE

## 2020-03-25 ENCOUNTER — HOSPITAL ENCOUNTER (OUTPATIENT)
Dept: MAMMOGRAPHY | Facility: HOSPITAL | Age: 60
End: 2020-03-25

## 2020-09-24 ENCOUNTER — APPOINTMENT (OUTPATIENT)
Dept: BONE DENSITY | Facility: HOSPITAL | Age: 60
End: 2020-09-24

## 2020-09-24 ENCOUNTER — APPOINTMENT (OUTPATIENT)
Dept: MAMMOGRAPHY | Facility: HOSPITAL | Age: 60
End: 2020-09-24

## 2020-10-08 ENCOUNTER — TRANSCRIBE ORDERS (OUTPATIENT)
Dept: OTHER | Facility: OTHER | Age: 60
End: 2020-10-08

## 2020-10-08 ENCOUNTER — HOSPITAL ENCOUNTER (OUTPATIENT)
Dept: GENERAL RADIOLOGY | Facility: HOSPITAL | Age: 60
Discharge: HOME | End: 2020-10-08
Admitting: PHYSICIAN ASSISTANT

## 2020-10-08 DIAGNOSIS — M25.561 ACUTE PAIN OF RIGHT KNEE: ICD-10-CM

## 2020-10-08 DIAGNOSIS — M25.561 ACUTE PAIN OF RIGHT KNEE: Primary | ICD-10-CM

## 2020-10-08 PROCEDURE — 73562 X-RAY EXAM OF KNEE 3: CPT

## 2020-10-08 PROCEDURE — 73562 X-RAY EXAM OF KNEE 3: CPT | Performed by: RADIOLOGY

## 2020-11-17 ENCOUNTER — LAB (OUTPATIENT)
Dept: LAB | Facility: HOSPITAL | Age: 60
End: 2020-11-17

## 2020-11-17 ENCOUNTER — TRANSCRIBE ORDERS (OUTPATIENT)
Dept: OTHER | Facility: OTHER | Age: 60
End: 2020-11-17

## 2020-11-17 DIAGNOSIS — Z20.828 EXPOSURE TO SARS-ASSOCIATED CORONAVIRUS: Primary | ICD-10-CM

## 2020-11-18 ENCOUNTER — LAB (OUTPATIENT)
Dept: LAB | Facility: HOSPITAL | Age: 60
End: 2020-11-18

## 2020-11-18 PROCEDURE — C9803 HOPD COVID-19 SPEC COLLECT: HCPCS

## 2020-11-18 PROCEDURE — U0004 COV-19 TEST NON-CDC HGH THRU: HCPCS | Performed by: PHYSICIAN ASSISTANT

## 2020-11-19 LAB — SARS-COV-2 RNA RESP QL NAA+PROBE: NOT DETECTED

## 2021-01-12 ENCOUNTER — IMMUNIZATION (OUTPATIENT)
Dept: VACCINE CLINIC | Facility: HOSPITAL | Age: 61
End: 2021-01-12

## 2021-01-12 PROCEDURE — 91300 HC SARSCOV02 VAC 30MCG/0.3ML IM: CPT | Performed by: FAMILY MEDICINE

## 2021-01-12 PROCEDURE — 0001A: CPT | Performed by: FAMILY MEDICINE

## 2021-01-14 ENCOUNTER — APPOINTMENT (OUTPATIENT)
Dept: VACCINE CLINIC | Facility: HOSPITAL | Age: 61
End: 2021-01-14

## 2021-01-21 NOTE — THERAPY EVALUATION
Outpatient Physical Therapy Ortho Initial Evaluation  ANDREA Shrestha     Patient Name: Jolanta Haile  : 1960  MRN: [id number]  Today's Date: 2017      Visit Date: 2017    Patient Active Problem List   Diagnosis   • Pain of left breast   • Visit for wound check        Past Medical History:   Diagnosis Date   • Anxiety    • B12 deficiency    • Blurred vision    • Difficulty swallowing solids    • Dyslipidemia    • Headache    • Hearing loss    • Hypotension, unspecified    • Insomnia    • Malaise and fatigue    • Multiple sprains    • Neck mass    • Sciatica    • Skin cancer    • Vitamin D deficiency         Past Surgical History:   Procedure Laterality Date   • AUGMENTATION MAMMAPLASTY     • BREAST AUGMENTATION     • MANDIBLE FRACTURE SURGERY     • MANDIBLE SURGERY     • TONSILLECTOMY         Visit Dx:     ICD-10-CM ICD-9-CM   1. Chronic hip pain, left M25.552 719.45    G89.29 338.29             Patient History       17 0800          History    Chief Complaint Difficulty Walking;Difficulty with daily activities;Falls/history of falls;Joint stiffness;Muscle tenderness;Muscle weakness;Pain  -RT      Type of Pain Hip pain;Back pain   left  -RT      Date Current Problem(s) Began --   6 months  -RT      Brief Description of Current Complaint Pt has suffered from back pain for the past eight years following a fall.  She has been having increased bilateral knee pain for the past year and reports having increased left hip and buttock pain for the past six months.  Pt reports the pain increases with walking.  She was evaluated by MD Vance and the patient was referred to therapy.  -RT      Patient/Caregiver Goals Relieve pain;Improve mobility;Improve strength  -RT      Current Tobacco Use no  -RT      Patient's Rating of General Health Fair  -RT      Hand Dominance left-handed  -RT      Patient seeing anyone else for problem(s)? No  -RT      How has patient tried to help current problem? meds,  rest, heat  -RT      Pain     Pain Location Hip;Back  -RT      Pain at Present 8  -RT      Pain at Best 5  -RT      Pain at Worst 10  -RT      Pain Frequency Constant/continuous  -RT      Pain Description Aching;Stabbing  -RT      What Performance Factors Make the Current Problem(s) WORSE? walking, squatting, sitting  -RT      Tolerance Time- Standing 5min  -RT      Tolerance Time- Sitting 5min  -RT      Fall Risk Assessment    Any falls in the past year: Yes  -RT      Number of falls reported in the last 12 months 1  -RT      Factors that contributed to the fall: Tripped  -RT      Daily Activities    Are you able to read Yes  -RT      Are you able to write Yes  -RT      How does patient learn best? Reading;Listening;Demonstration;Pictures/Video  -RT      Safety    Are you being hurt, hit, or frightened by anyone at home or in your life? No  -RT      Are you being neglected by a caregiver No  -RT        User Key  (r) = Recorded By, (t) = Taken By, (c) = Cosigned By    Initials Name Provider Type    RT Alexsander Littlejohn PT Physical Therapist                PT Ortho       08/02/17 0900    Posture/Observations    Posture/Observations Comments pt amb with decreased stance on left leg; mild rounded posture  -RT    Neural Tension Signs- Lower Quarter Clearing    Slump Left:;Positive  -RT    Sensory Screen for Light Touch- Lower Quarter Clearing    L1 (inguinal area) Bilateral:;Intact  -RT    L2 (anterior mid thigh) Bilateral:;Intact  -RT    L3 (distal anterior thigh) Bilateral:;Intact  -RT    L4 (medial lower leg/foot) Bilateral:;Intact  -RT    L5 (lateral lower leg/great toe) Bilateral:;Intact  -RT    S1 (bottom of foot) Bilateral:;Intact  -RT    Myotomal Screen- Lower Quarter Clearing    Hip flexion (L2) Bilateral:;3+ (Fair +)  -RT    Knee extension (L3) Right:;4- (Good -);Left:;3+ (Fair +)  -RT    Knee flexion (S2) Right:;4- (Good -);Left:;3+ (Fair +)  -RT    Hip/Thigh Palpation    Greater Trochanter Left:;Tender  -RT     Iliopsoas Left:;Tender  -RT    Gluteus Medius Left:;Tender  -RT    Piriformis Left:;Tender  -RT    Hip Special Tests    Hip scour test (labral vs hip pathology) Left:;Positive  -RT      User Key  (r) = Recorded By, (t) = Taken By, (c) = Cosigned By    Initials Name Provider Type    RT Alexsander Littlejohn PT Physical Therapist                            Therapy Education       08/02/17 0943          Therapy Education    Given HEP;Symptoms/condition management;Pain management;Posture/body mechanics  -RT      Program New  -RT      How Provided Verbal;Demonstration;Written  -RT      Provided to Patient  -RT      Level of Understanding Teach back education performed;Verbalized;Demonstrated  -RT        User Key  (r) = Recorded By, (t) = Taken By, (c) = Cosigned By    Initials Name Provider Type    RT Alexsander Littlejohn PT Physical Therapist                PT OP Goals       08/02/17 0900       PT Short Term Goals    STG Date to Achieve 08/16/17  -RT     STG 1 Pt will be instructed in a HEP.  -RT     STG 1 Progress New  -RT     STG 2 Pt will demonstrate bilateral LE groos strength 4-/5 or greater.  -RT     STG 2 Progress New  -RT     STG 3 Pt will be able to sit and stand for 10min without increased pain.  -RT     STG 3 Progress New  -RT     Long Term Goals    LTG Date to Achieve 08/30/17  -RT     LTG 1 Pt will report worst pain to be no greater than 7/10.  -RT     LTG 1 Progress New  -RT     LTG 2 Pt will be instructed on a LE and core stabilization program.  -RT     LTG 2 Progress New  -RT     LTG 3 Pt will improve her LEFS to 35 or greater.  -RT     LTG 3 Progress New  -RT     Time Calculation    PT Goal Re-Cert Due Date 08/30/17  -RT       User Key  (r) = Recorded By, (t) = Taken By, (c) = Cosigned By    Initials Name Provider Type    RT Alexsander Littlejohn PT Physical Therapist                PT Assessment/Plan       08/02/17 0946       PT Assessment    Functional Limitations Impaired gait;Performance in leisure activities   -RT     Impairments Endurance;Range of motion;Posture;Poor body mechanics;Pain;Muscle strength  -RT     Assessment Comments Pt is a 57 y/o female referred to therapy for treatment of left hip pain.  Pt presents with considerable tenderness along left GT and piriformis region consistant with GT bursitis and piriformis syndrome.  Pt will benefit form therpay for core stabilizationfor to decrease pain.  -RT     Rehab Potential Fair  -RT     Patient/caregiver participated in establishment of treatment plan and goals Yes  -RT     Patient would benefit from skilled therapy intervention Yes  -RT     PT Plan    PT Frequency 2x/week  -RT     Predicted Duration of Therapy Intervention (days/wks) 3 weeks  -RT     Planned CPT's? PT EVAL HIGH COMPLEXITY: 33983;PT RE-EVAL: 84871;PT THER PROC EA 15 MIN: 19455;PT THER ACT EA 15 MIN: 71835;PT MANUAL THERAPY EA 15 MIN: 49582;PT NEUROMUSC RE-EDUCATION EA 15 MIN: 22405;PT GAIT TRAINING EA 15 MIN: 96577;PT ELECTRICAL STIM UNATTEND: ;PT ULTRASOUND EA 15 MIN: 79134;PT HOT/COLD PACK WC NONMCARE: 44958  -RT     Physical Therapy Interventions (Optional Details) balance training;neuromuscular re-education;modalities;manual therapy techniques;lumbar stabilization;joint mobilization;home exercise program;patient/family education;postural re-education;ROM (Range of Motion);strengthening;stretching  -RT     PT Plan Comments Will follow for optimal gains  -RT       User Key  (r) = Recorded By, (t) = Taken By, (c) = Cosigned By    Initials Name Provider Type    RT Alexsander S Annetta, PT Physical Therapist                                    Outcome Measures       08/02/17 0900          Lower Extremity Functional Index    Any of your usual work, housework or school activities 2  -RT      Your usual hobbies, recreational or sporting activities 1  -RT      Getting into or out of the bath 3  -RT      Walking between rooms 3  -RT      Putting on your shoes or socks 2  -RT      Squatting 1  -RT       Lifting an object, like a bag of groceries from the floor 2  -RT      Performing light activities around your home 2  -RT      Performing heavy activities around your home 3  -RT      Getting into or out of a car 2  -RT      Walking 2 blocks 2  -RT      Walking a mile 1  -RT      Going up or down 10 stairs (about 1 flight of stairs) 1  -RT      Standing for 1 hour 0  -RT      Sitting for 1 hour 0  -RT      Running on even ground 0  -RT      Running on uneven ground 0  -RT      Making sharp turns while running fast 0  -RT      Hopping 0  -RT      Rolling over in bed 2  -RT      Total 27  -RT      Functional Assessment    Outcome Measure Options Lower Extremity Functional Scale (LEFS)  -RT        User Key  (r) = Recorded By, (t) = Taken By, (c) = Cosigned By    Initials Name Provider Type    RT Alexsander Littlejohn, PT Physical Therapist            Time Calculation:   Start Time: 0835  Stop Time: 0935  Time Calculation (min): 60 min     Therapy Charges for Today     Code Description Service Date Service Provider Modifiers Qty    01926591361 HC PT EVAL HIGH COMPLEXITY 4 8/2/2017 Alexsander Littlejohn, PT GP 1          PT G-Codes  Outcome Measure Options: Lower Extremity Functional Scale (LEFS)         Alexsander Littlejohn, PT  8/2/2017         Yes... Opioid Counseling: I discussed with the patient the potential side effects of opioids including but not limited to addiction, altered mental status, and depression. I stressed avoiding alcohol, benzodiazepines, muscle relaxants and sleep aids unless specifically okayed by a physician. The patient verbalized understanding of the proper use and possible adverse effects of opioids. All of the patient's questions and concerns were addressed. They were instructed to flush the remaining pills down the toilet if they did not need them for pain.

## 2021-02-02 ENCOUNTER — IMMUNIZATION (OUTPATIENT)
Dept: VACCINE CLINIC | Facility: HOSPITAL | Age: 61
End: 2021-02-02

## 2021-02-02 PROCEDURE — 91300 HC SARSCOV02 VAC 30MCG/0.3ML IM: CPT | Performed by: FAMILY MEDICINE

## 2021-02-02 PROCEDURE — 0002A: CPT | Performed by: FAMILY MEDICINE

## 2021-03-02 ENCOUNTER — TRANSCRIBE ORDERS (OUTPATIENT)
Dept: ADMINISTRATIVE | Facility: HOSPITAL | Age: 61
End: 2021-03-02

## 2021-03-02 DIAGNOSIS — M25.561 ACUTE PAIN OF RIGHT KNEE: Primary | ICD-10-CM

## 2021-03-09 ENCOUNTER — HOSPITAL ENCOUNTER (OUTPATIENT)
Dept: MRI IMAGING | Facility: HOSPITAL | Age: 61
Discharge: HOME | End: 2021-03-09
Admitting: INTERNAL MEDICINE

## 2021-03-09 DIAGNOSIS — M25.561 ACUTE PAIN OF RIGHT KNEE: ICD-10-CM

## 2021-03-09 PROCEDURE — 73721 MRI JNT OF LWR EXTRE W/O DYE: CPT | Performed by: RADIOLOGY

## 2021-03-09 PROCEDURE — 73721 MRI JNT OF LWR EXTRE W/O DYE: CPT

## 2021-03-31 ENCOUNTER — HOSPITAL ENCOUNTER (OUTPATIENT)
Dept: BONE DENSITY | Facility: HOSPITAL | Age: 61
Discharge: HOME | End: 2021-03-31

## 2021-03-31 ENCOUNTER — HOSPITAL ENCOUNTER (OUTPATIENT)
Dept: MAMMOGRAPHY | Facility: HOSPITAL | Age: 61
Discharge: HOME | End: 2021-03-31

## 2021-03-31 DIAGNOSIS — Z12.31 VISIT FOR SCREENING MAMMOGRAM: ICD-10-CM

## 2021-03-31 DIAGNOSIS — Z78.0 POST-MENOPAUSAL: ICD-10-CM

## 2021-03-31 PROCEDURE — 77063 BREAST TOMOSYNTHESIS BI: CPT | Performed by: RADIOLOGY

## 2021-03-31 PROCEDURE — 77063 BREAST TOMOSYNTHESIS BI: CPT

## 2021-03-31 PROCEDURE — 77067 SCR MAMMO BI INCL CAD: CPT | Performed by: RADIOLOGY

## 2021-03-31 PROCEDURE — 77080 DXA BONE DENSITY AXIAL: CPT | Performed by: RADIOLOGY

## 2021-03-31 PROCEDURE — 77080 DXA BONE DENSITY AXIAL: CPT

## 2021-03-31 PROCEDURE — 77067 SCR MAMMO BI INCL CAD: CPT

## 2021-04-07 ENCOUNTER — LAB (OUTPATIENT)
Dept: LAB | Facility: HOSPITAL | Age: 61
End: 2021-04-07

## 2021-04-07 ENCOUNTER — HOSPITAL ENCOUNTER (OUTPATIENT)
Dept: RESPIRATORY THERAPY | Facility: HOSPITAL | Age: 61
Discharge: HOME | End: 2021-04-07

## 2021-04-07 ENCOUNTER — TRANSCRIBE ORDERS (OUTPATIENT)
Dept: ADMINISTRATIVE | Facility: HOSPITAL | Age: 61
End: 2021-04-07

## 2021-04-07 DIAGNOSIS — Z01.818 OTHER SPECIFIED PRE-OPERATIVE EXAMINATION: ICD-10-CM

## 2021-04-07 DIAGNOSIS — Z98.890 S/P RIGHT KNEE ARTHROSCOPY: Primary | ICD-10-CM

## 2021-04-07 DIAGNOSIS — Z98.890 S/P RIGHT KNEE ARTHROSCOPY: ICD-10-CM

## 2021-04-07 LAB
ALBUMIN SERPL-MCNC: 4.4 G/DL (ref 3.5–5.2)
ALBUMIN/GLOB SERPL: 1.6 G/DL
ALP SERPL-CCNC: 89 U/L (ref 39–117)
ALT SERPL W P-5'-P-CCNC: 16 U/L (ref 1–33)
ANION GAP SERPL CALCULATED.3IONS-SCNC: 10.7 MMOL/L (ref 5–15)
AST SERPL-CCNC: 25 U/L (ref 1–32)
BASOPHILS # BLD AUTO: 0.03 10*3/MM3 (ref 0–0.2)
BASOPHILS NFR BLD AUTO: 0.7 % (ref 0–1.5)
BILIRUB SERPL-MCNC: 0.4 MG/DL (ref 0–1.2)
BUN SERPL-MCNC: 15 MG/DL (ref 6–20)
BUN/CREAT SERPL: 18.5 (ref 7–25)
CALCIUM SPEC-SCNC: 9.4 MG/DL (ref 8.6–10.5)
CHLORIDE SERPL-SCNC: 104 MMOL/L (ref 98–107)
CO2 SERPL-SCNC: 26.3 MMOL/L (ref 22–29)
CREAT SERPL-MCNC: 0.81 MG/DL (ref 0.57–1)
DEPRECATED RDW RBC AUTO: 44.5 FL (ref 37–54)
EOSINOPHIL # BLD AUTO: 0.2 10*3/MM3 (ref 0–0.4)
EOSINOPHIL NFR BLD AUTO: 4.8 % (ref 0.3–6.2)
ERYTHROCYTE [DISTWIDTH] IN BLOOD BY AUTOMATED COUNT: 12.5 % (ref 12.3–15.4)
GFR SERPL CREATININE-BSD FRML MDRD: 72 ML/MIN/1.73
GLOBULIN UR ELPH-MCNC: 2.8 GM/DL
GLUCOSE SERPL-MCNC: 92 MG/DL (ref 65–99)
HBA1C MFR BLD: 4.83 % (ref 4.8–5.6)
HCT VFR BLD AUTO: 37.4 % (ref 34–46.6)
HGB BLD-MCNC: 12.2 G/DL (ref 12–15.9)
IMM GRANULOCYTES # BLD AUTO: 0.01 10*3/MM3 (ref 0–0.05)
IMM GRANULOCYTES NFR BLD AUTO: 0.2 % (ref 0–0.5)
LYMPHOCYTES # BLD AUTO: 1.53 10*3/MM3 (ref 0.7–3.1)
LYMPHOCYTES NFR BLD AUTO: 36.8 % (ref 19.6–45.3)
MCH RBC QN AUTO: 31.4 PG (ref 26.6–33)
MCHC RBC AUTO-ENTMCNC: 32.6 G/DL (ref 31.5–35.7)
MCV RBC AUTO: 96.4 FL (ref 79–97)
MONOCYTES # BLD AUTO: 0.33 10*3/MM3 (ref 0.1–0.9)
MONOCYTES NFR BLD AUTO: 7.9 % (ref 5–12)
NEUTROPHILS NFR BLD AUTO: 2.06 10*3/MM3 (ref 1.7–7)
NEUTROPHILS NFR BLD AUTO: 49.6 % (ref 42.7–76)
NRBC BLD AUTO-RTO: 0 /100 WBC (ref 0–0.2)
PLATELET # BLD AUTO: 292 10*3/MM3 (ref 140–450)
PMV BLD AUTO: 11.4 FL (ref 6–12)
POTASSIUM SERPL-SCNC: 4.2 MMOL/L (ref 3.5–5.2)
PROT SERPL-MCNC: 7.2 G/DL (ref 6–8.5)
QT INTERVAL: 366 MS
QTC INTERVAL: 445 MS
RBC # BLD AUTO: 3.88 10*6/MM3 (ref 3.77–5.28)
SODIUM SERPL-SCNC: 141 MMOL/L (ref 136–145)
WBC # BLD AUTO: 4.16 10*3/MM3 (ref 3.4–10.8)

## 2021-04-07 PROCEDURE — 93005 ELECTROCARDIOGRAM TRACING: CPT | Performed by: ORTHOPAEDIC SURGERY

## 2021-04-07 PROCEDURE — 36415 COLL VENOUS BLD VENIPUNCTURE: CPT

## 2021-04-07 PROCEDURE — 83036 HEMOGLOBIN GLYCOSYLATED A1C: CPT

## 2021-04-07 PROCEDURE — 85025 COMPLETE CBC W/AUTO DIFF WBC: CPT

## 2021-04-07 PROCEDURE — 80053 COMPREHEN METABOLIC PANEL: CPT

## 2021-04-07 PROCEDURE — 93010 ELECTROCARDIOGRAM REPORT: CPT | Performed by: INTERNAL MEDICINE

## 2021-04-08 ENCOUNTER — LAB (OUTPATIENT)
Dept: LAB | Facility: HOSPITAL | Age: 61
End: 2021-04-08

## 2021-04-08 DIAGNOSIS — Z01.818 OTHER SPECIFIED PRE-OPERATIVE EXAMINATION: ICD-10-CM

## 2021-04-08 PROCEDURE — C9803 HOPD COVID-19 SPEC COLLECT: HCPCS

## 2021-04-08 PROCEDURE — U0004 COV-19 TEST NON-CDC HGH THRU: HCPCS | Performed by: ORTHOPAEDIC SURGERY

## 2021-04-08 PROCEDURE — U0005 INFEC AGEN DETEC AMPLI PROBE: HCPCS | Performed by: ORTHOPAEDIC SURGERY

## 2021-04-09 LAB — SARS-COV-2 RNA NOSE QL NAA+PROBE: NOT DETECTED

## 2021-07-22 ENCOUNTER — TRANSCRIBE ORDERS (OUTPATIENT)
Dept: ADMINISTRATIVE | Facility: HOSPITAL | Age: 61
End: 2021-07-22

## 2021-07-22 DIAGNOSIS — M25.561 RIGHT KNEE PAIN, UNSPECIFIED CHRONICITY: Primary | ICD-10-CM

## 2021-07-28 ENCOUNTER — HOSPITAL ENCOUNTER (OUTPATIENT)
Dept: MRI IMAGING | Facility: HOSPITAL | Age: 61
Discharge: HOME | End: 2021-07-28
Admitting: ORTHOPAEDIC SURGERY

## 2021-07-28 DIAGNOSIS — M25.561 RIGHT KNEE PAIN, UNSPECIFIED CHRONICITY: ICD-10-CM

## 2021-07-28 PROCEDURE — 73721 MRI JNT OF LWR EXTRE W/O DYE: CPT | Performed by: RADIOLOGY

## 2021-07-28 PROCEDURE — 73721 MRI JNT OF LWR EXTRE W/O DYE: CPT

## 2021-09-09 ENCOUNTER — IMMUNIZATION (OUTPATIENT)
Dept: VACCINE CLINIC | Facility: HOSPITAL | Age: 61
End: 2021-09-09

## 2021-09-09 PROCEDURE — 91300 HC SARSCOV02 VAC 30MCG/0.3ML IM: CPT | Performed by: INTERNAL MEDICINE

## 2021-09-09 PROCEDURE — 0003A: CPT | Performed by: INTERNAL MEDICINE

## 2021-12-10 ENCOUNTER — OFFICE VISIT (OUTPATIENT)
Dept: SURGERY | Facility: CLINIC | Age: 61
End: 2021-12-10

## 2021-12-10 VITALS
HEIGHT: 66 IN | BODY MASS INDEX: 25.01 KG/M2 | SYSTOLIC BLOOD PRESSURE: 108 MMHG | WEIGHT: 155.6 LBS | DIASTOLIC BLOOD PRESSURE: 72 MMHG | HEART RATE: 90 BPM

## 2021-12-10 DIAGNOSIS — N64.4 BREAST PAIN: Primary | ICD-10-CM

## 2021-12-10 PROCEDURE — 99203 OFFICE O/P NEW LOW 30 MIN: CPT | Performed by: SURGERY

## 2021-12-10 RX ORDER — LORAZEPAM 0.5 MG/1
TABLET ORAL DAILY
COMMUNITY
Start: 2017-01-06

## 2021-12-10 RX ORDER — FLUTICASONE PROPIONATE 50 MCG
1-2 SPRAY, SUSPENSION (ML) NASAL
COMMUNITY
Start: 2016-08-11

## 2021-12-10 RX ORDER — CONJUGATED ESTROGENS 0.62 MG/G
0.5 CREAM VAGINAL
COMMUNITY
Start: 2021-10-25

## 2021-12-10 RX ORDER — HYDROCODONE BITARTRATE AND ACETAMINOPHEN 5; 325 MG/1; MG/1
TABLET ORAL EVERY 8 HOURS
COMMUNITY
Start: 2017-01-06

## 2021-12-10 RX ORDER — LEVOCETIRIZINE DIHYDROCHLORIDE 5 MG/1
1 TABLET, FILM COATED ORAL EVERY EVENING
COMMUNITY
Start: 2021-10-25

## 2021-12-10 RX ORDER — METOCLOPRAMIDE 10 MG/1
TABLET ORAL
COMMUNITY
Start: 2021-10-25

## 2021-12-10 RX ORDER — FAMOTIDINE 40 MG/1
TABLET, FILM COATED ORAL
COMMUNITY
Start: 2021-08-19

## 2021-12-10 RX ORDER — SIMVASTATIN 10 MG
TABLET ORAL
COMMUNITY
Start: 2021-11-16

## 2021-12-10 RX ORDER — GABAPENTIN 400 MG/1
CAPSULE ORAL
COMMUNITY
Start: 2021-11-24

## 2021-12-10 RX ORDER — ONDANSETRON 4 MG/1
TABLET, FILM COATED ORAL
COMMUNITY
Start: 2021-09-14

## 2021-12-10 NOTE — PROGRESS NOTES
Subjective   Jolanta Haile is a 61 y.o. female here today for breast pain.    History of Present Illness  Ms. Haile was seen in the office today for breast evaluation. This is a 61-year-old female who had bilateral breast augmentation in 1997. She presents today with pain in the left side and also discomfort behind the implant. She feels that there is a big discrepancy between the right and left side and that the implant has also slipped laterally. She states the implant is like a foreign presence that is not part of her body. She is also concerned about the nipples pointing downward which is more prominent in the standing position. Patient did have a bilateral mammogram on 3/31/2021 which did include displaced views. No evidence of malignancy was identified. Patient denies any palpable masses within the breast tissue  Allergies   Allergen Reactions   • Codeine Nausea And Vomiting and Nausea Only     Current Outpatient Medications   Medication Sig Dispense Refill   • conjugated estrogens (Premarin) 0.625 MG/GM vaginal cream Insert 0.5 Applicatorfuls into the vagina.     • famotidine (PEPCID) 40 MG tablet Take  by mouth.     • fluticasone (Flonase) 50 MCG/ACT nasal spray 1-2 sprays into the nostril(s) as directed by provider.     • gabapentin (NEURONTIN) 300 MG capsule Take 300 mg by mouth 3 (three) times a day.     • gabapentin (NEURONTIN) 400 MG capsule      • HYDROcodone-acetaminophen (NORCO) 5-325 MG per tablet Take  by mouth Every 8 (Eight) Hours.     • levocetirizine (XYZAL) 5 MG tablet Take 1 tablet by mouth Every Evening.     • LORazepam (ATIVAN) 0.5 MG tablet Take 0.5 mg by mouth every 8 (eight) hours as needed for anxiety.     • LORazepam (Ativan) 0.5 MG tablet Take  by mouth Daily.     • meloxicam (MOBIC) 7.5 MG tablet Take 7.5 mg by mouth 2 (two) times a day.     • metoclopramide (REGLAN) 10 MG tablet take 1 tablet by oral route  every day  at bedtime     • ondansetron (ZOFRAN) 4 MG tablet      •  "simvastatin (ZOCOR) 10 MG tablet      • diclofenac (VOLTAREN) 50 MG EC tablet Take 1 tablet by mouth 3 (three) times a day. 20 tablet 0   • HYDROcodone-acetaminophen (NORCO) 5-325 MG per tablet Take 1 tablet by mouth every 6 (six) hours as needed.     • promethazine (PHENERGAN) 25 MG tablet Take 25 mg by mouth every 6 (six) hours as needed for nausea or vomiting.     • SUMAtriptan (IMITREX) 50 MG tablet Take one tablet at onset of headache. May repeat dose one time in 2 hours if headache not relieved. 10 tablet 0   • zolpidem (AMBIEN) 5 MG tablet Take 5 mg by mouth at night as needed for sleep.       No current facility-administered medications for this visit.     Past Medical History:   Diagnosis Date   • Anxiety    • Arthritis    • B12 deficiency    • Blurred vision    • Coronary artery disease    • Difficulty swallowing solids    • Dyslipidemia    • Headache    • Hearing loss    • Heart attack (HCC)    • Hypotension, unspecified    • Insomnia    • Malaise and fatigue    • Multiple sprains    • Neck mass    • Sciatica    • Skin cancer    • Vitamin D deficiency      Past Surgical History:   Procedure Laterality Date   • AUGMENTATION MAMMAPLASTY  1998   • BREAST AUGMENTATION     • MANDIBLE FRACTURE SURGERY     • MANDIBLE SURGERY     • TONSILLECTOMY         Pertinent Review of Systems:  Respiratory: no shortness of breath  Cardiovascular: no chest pain  Other pertinent:      Objective   /72 (BP Location: Left arm)   Pulse 90   Ht 167.6 cm (66\")   Wt 70.6 kg (155 lb 9.6 oz)   BMI 25.11 kg/m²   Physical Exam  On examination this is a well-developed well-nourished female in no acute distress  HEENT examination: Within normal limits.  Conjunctiva pink.  Nose and ears appear normal.  Neck: Supple, full range of motion.  No JVD.  Musculoskeletal: Full range of motion all extremities without focal weakness. Normal gait. No digital cyanosis.  Psych: Patient is alert, oriented x3. Mood and affect are " appropriate.  Breasts: On visual inspection the left nipple is slightly lower than the right. Bilateral implants are in place. Examination of the right breast demonstrates no palpable mass or adenopathy. Implant has shifted some laterally. Examination of the left breast demonstrates no palpable mass or adenopathy. Again there is a lateral shift of the implant.    Procedures     Results/Data:  Imaging: Mammogram reports and images from 3/31/2021 were reviewed. I agree with the assessment      Assessment/Plan   Left chest pain likely related to lateral and downward shifting of the implant. There is no clinical evidence to suggest implant rupture    Plan: Options include doing nothing versus implant replacement. As the patient is concerned about the breast drooping and the downward look of the nipples she would also require a mastopexy at the same time to correct that problem. If the patient just wants her implants removed and nothing else this could be done. It was discussed that this would also require capsulectomy. In my opinion the patient has adequate breast tissue that she should get a good cosmetic appearance and symmetry of the breasts.    Patient will follow up as needed       Discussion/Summary    Time spent:     Patient's Body mass index is 25.11 kg/m². indicating that she is within normal range (BMI 18.5-24.9). No BMI management plan needed..       No future appointments.      Please note that portions of this note were completed with a voice recognition program.

## 2023-12-08 ENCOUNTER — TRANSCRIBE ORDERS (OUTPATIENT)
Dept: ADMINISTRATIVE | Facility: HOSPITAL | Age: 63
End: 2023-12-08
Payer: MEDICARE

## 2023-12-08 DIAGNOSIS — Z12.31 VISIT FOR SCREENING MAMMOGRAM: Primary | ICD-10-CM

## 2023-12-27 ENCOUNTER — HOSPITAL ENCOUNTER (OUTPATIENT)
Facility: HOSPITAL | Age: 63
Discharge: HOME | End: 2023-12-27
Admitting: INTERNAL MEDICINE
Payer: MEDICARE

## 2023-12-27 DIAGNOSIS — Z12.31 VISIT FOR SCREENING MAMMOGRAM: ICD-10-CM

## 2023-12-27 PROCEDURE — 77067 SCR MAMMO BI INCL CAD: CPT

## 2023-12-27 PROCEDURE — 77063 BREAST TOMOSYNTHESIS BI: CPT

## 2024-05-14 ENCOUNTER — APPOINTMENT (RX ONLY)
Dept: URBAN - METROPOLITAN AREA OTHER 13 | Facility: OTHER | Age: 64
Setting detail: DERMATOLOGY
End: 2024-05-14

## 2024-05-14 DIAGNOSIS — D18.0 HEMANGIOMA: ICD-10-CM

## 2024-05-14 DIAGNOSIS — D485 NEOPLASM OF UNCERTAIN BEHAVIOR OF SKIN: ICD-10-CM

## 2024-05-14 DIAGNOSIS — Z71.89 OTHER SPECIFIED COUNSELING: ICD-10-CM

## 2024-05-14 DIAGNOSIS — L82.1 OTHER SEBORRHEIC KERATOSIS: ICD-10-CM

## 2024-05-14 DIAGNOSIS — D22 MELANOCYTIC NEVI: ICD-10-CM

## 2024-05-14 DIAGNOSIS — L56.5 DISSEMINATED SUPERFICIAL ACTINIC POROKERATOSIS (DSAP): ICD-10-CM

## 2024-05-14 DIAGNOSIS — L81.4 OTHER MELANIN HYPERPIGMENTATION: ICD-10-CM

## 2024-05-14 DIAGNOSIS — R21 RASH AND OTHER NONSPECIFIC SKIN ERUPTION: ICD-10-CM

## 2024-05-14 DIAGNOSIS — Z85.828 PERSONAL HISTORY OF OTHER MALIGNANT NEOPLASM OF SKIN: ICD-10-CM

## 2024-05-14 DIAGNOSIS — L57.0 ACTINIC KERATOSIS: ICD-10-CM

## 2024-05-14 PROBLEM — D48.5 NEOPLASM OF UNCERTAIN BEHAVIOR OF SKIN: Status: ACTIVE | Noted: 2024-05-14

## 2024-05-14 PROBLEM — D22.5 MELANOCYTIC NEVI OF TRUNK: Status: ACTIVE | Noted: 2024-05-14

## 2024-05-14 PROBLEM — D18.01 HEMANGIOMA OF SKIN AND SUBCUTANEOUS TISSUE: Status: ACTIVE | Noted: 2024-05-14

## 2024-05-14 PROCEDURE — 11306 SHAVE SKIN LESION 0.6-1.0 CM: CPT | Mod: 59

## 2024-05-14 PROCEDURE — 11102 TANGNTL BX SKIN SINGLE LES: CPT | Mod: 59

## 2024-05-14 PROCEDURE — ? SHAVE REMOVAL

## 2024-05-14 PROCEDURE — ? NOTED ON EXAM BUT NOT TREATED

## 2024-05-14 PROCEDURE — ? BIOPSY BY SHAVE METHOD

## 2024-05-14 PROCEDURE — ? SUNSCREEN RECOMMENDATIONS

## 2024-05-14 PROCEDURE — 11301 SHAVE SKIN LESION 0.6-1.0 CM: CPT | Mod: 59

## 2024-05-14 PROCEDURE — ? COUNSELING

## 2024-05-14 PROCEDURE — ? LIQUID NITROGEN

## 2024-05-14 PROCEDURE — 99203 OFFICE O/P NEW LOW 30 MIN: CPT | Mod: 25

## 2024-05-14 PROCEDURE — 17004 DESTROY PREMAL LESIONS 15/>: CPT

## 2024-05-14 ASSESSMENT — LOCATION DETAILED DESCRIPTION DERM
LOCATION DETAILED: LEFT LATERAL EYEBROW
LOCATION DETAILED: RIGHT MEDIAL PROXIMAL PRETIBIAL REGION
LOCATION DETAILED: LEFT LATERAL PROXIMAL PRETIBIAL REGION
LOCATION DETAILED: SUPERIOR THORACIC SPINE
LOCATION DETAILED: LEFT RADIAL DORSAL HAND
LOCATION DETAILED: RIGHT CENTRAL EYEBROW
LOCATION DETAILED: RIGHT SUPERIOR CENTRAL MALAR CHEEK
LOCATION DETAILED: RIGHT ANTERIOR SHOULDER
LOCATION DETAILED: LEFT DISTAL DORSAL FOREARM
LOCATION DETAILED: RIGHT SUPERIOR MEDIAL UPPER BACK
LOCATION DETAILED: LEFT LATERAL DISTAL PRETIBIAL REGION
LOCATION DETAILED: LEFT RADIAL DORSAL HAND
LOCATION DETAILED: LEFT ANTERIOR DISTAL THIGH
LOCATION DETAILED: RIGHT DISTAL PRETIBIAL REGION
LOCATION DETAILED: RIGHT MEDIAL SUPERIOR CHEST
LOCATION DETAILED: LEFT PROXIMAL POSTERIOR UPPER ARM
LOCATION DETAILED: RIGHT INFERIOR LATERAL FOREHEAD
LOCATION DETAILED: EPIGASTRIC SKIN
LOCATION DETAILED: RIGHT ANTERIOR DISTAL THIGH
LOCATION DETAILED: LEFT CENTRAL MALAR CHEEK
LOCATION DETAILED: LEFT CENTRAL BUCCAL CHEEK
LOCATION DETAILED: LEFT LATERAL MALAR CHEEK

## 2024-05-14 ASSESSMENT — LOCATION SIMPLE DESCRIPTION DERM
LOCATION SIMPLE: RIGHT PRETIBIAL REGION
LOCATION SIMPLE: LEFT HAND
LOCATION SIMPLE: LEFT THIGH
LOCATION SIMPLE: LEFT FOREARM
LOCATION SIMPLE: RIGHT FOREHEAD
LOCATION SIMPLE: LEFT POSTERIOR UPPER ARM
LOCATION SIMPLE: LEFT PRETIBIAL REGION
LOCATION SIMPLE: RIGHT CHEEK
LOCATION SIMPLE: RIGHT EYEBROW
LOCATION SIMPLE: LEFT CHEEK
LOCATION SIMPLE: ABDOMEN
LOCATION SIMPLE: RIGHT THIGH
LOCATION SIMPLE: LEFT HAND
LOCATION SIMPLE: LEFT EYEBROW
LOCATION SIMPLE: RIGHT SHOULDER
LOCATION SIMPLE: CHEST
LOCATION SIMPLE: RIGHT UPPER BACK
LOCATION SIMPLE: UPPER BACK

## 2024-05-14 ASSESSMENT — LOCATION ZONE DERM
LOCATION ZONE: ARM
LOCATION ZONE: HAND
LOCATION ZONE: ARM
LOCATION ZONE: HAND
LOCATION ZONE: LEG
LOCATION ZONE: FACE
LOCATION ZONE: TRUNK

## 2024-08-26 ENCOUNTER — APPOINTMENT (RX ONLY)
Dept: URBAN - METROPOLITAN AREA OTHER 13 | Facility: OTHER | Age: 64
Setting detail: DERMATOLOGY
End: 2024-08-26

## 2024-08-26 DIAGNOSIS — D22 MELANOCYTIC NEVI: ICD-10-CM

## 2024-08-26 PROBLEM — D22.5 MELANOCYTIC NEVI OF TRUNK: Status: ACTIVE | Noted: 2024-08-26

## 2024-08-26 PROCEDURE — 11404 EXC TR-EXT B9+MARG 3.1-4 CM: CPT

## 2024-08-26 PROCEDURE — 12034 INTMD RPR S/TR/EXT 7.6-12.5: CPT

## 2024-08-26 PROCEDURE — ? EXCISION

## 2024-08-26 PROCEDURE — ? PRESCRIPTION

## 2024-08-26 RX ORDER — MUPIROCIN 20 MG/G
OINTMENT TOPICAL
Qty: 22 | Refills: 0 | Status: ERX | COMMUNITY
Start: 2024-08-26

## 2024-08-26 RX ORDER — DOXYCYCLINE HYCLATE 100 MG/1
TABLET, COATED ORAL
Qty: 28 | Refills: 0 | Status: ERX | COMMUNITY
Start: 2024-08-26

## 2024-08-26 RX ADMIN — DOXYCYCLINE HYCLATE: 100 TABLET, COATED ORAL at 00:00

## 2024-08-26 RX ADMIN — MUPIROCIN: 20 OINTMENT TOPICAL at 00:00

## 2024-08-26 ASSESSMENT — LOCATION ZONE DERM: LOCATION ZONE: TRUNK

## 2024-08-26 ASSESSMENT — LOCATION SIMPLE DESCRIPTION DERM: LOCATION SIMPLE: UPPER BACK

## 2024-08-26 ASSESSMENT — LOCATION DETAILED DESCRIPTION DERM: LOCATION DETAILED: SUPERIOR THORACIC SPINE

## 2024-09-12 ENCOUNTER — APPOINTMENT (RX ONLY)
Dept: URBAN - METROPOLITAN AREA OTHER 13 | Facility: OTHER | Age: 64
Setting detail: DERMATOLOGY
End: 2024-09-12

## 2024-09-12 PROBLEM — D03.59 MELANOMA IN SITU OF OTHER PART OF TRUNK: Status: ACTIVE | Noted: 2024-09-12

## 2024-09-12 PROCEDURE — ? EXCISION

## 2024-09-12 PROCEDURE — 12034 INTMD RPR S/TR/EXT 7.6-12.5: CPT

## 2024-09-12 PROCEDURE — 11606 EXC TR-EXT MAL+MARG >4 CM: CPT

## 2024-09-12 PROCEDURE — ? PRESCRIPTION

## 2024-09-12 RX ORDER — MUPIROCIN 20 MG/G
OINTMENT TOPICAL
Qty: 22 | Refills: 0 | Status: ERX

## 2024-09-26 ENCOUNTER — APPOINTMENT (RX ONLY)
Dept: URBAN - METROPOLITAN AREA OTHER 13 | Facility: OTHER | Age: 64
Setting detail: DERMATOLOGY
End: 2024-09-26

## 2024-09-26 DIAGNOSIS — Z86.006 PERSONAL HISTORY OF MELANOMA IN-SITU: ICD-10-CM | Status: RESOLVED

## 2024-09-26 PROCEDURE — ? SUTURE REMOVAL (GLOBAL PERIOD)

## 2024-09-26 ASSESSMENT — LOCATION DETAILED DESCRIPTION DERM: LOCATION DETAILED: SUPERIOR THORACIC SPINE

## 2024-09-26 ASSESSMENT — LOCATION SIMPLE DESCRIPTION DERM: LOCATION SIMPLE: UPPER BACK

## 2024-09-26 ASSESSMENT — LOCATION ZONE DERM: LOCATION ZONE: TRUNK

## 2025-04-04 ENCOUNTER — TRANSCRIBE ORDERS (OUTPATIENT)
Dept: ADMINISTRATIVE | Facility: HOSPITAL | Age: 65
End: 2025-04-04
Payer: MEDICARE

## 2025-04-04 DIAGNOSIS — I34.0 NONRHEUMATIC MITRAL VALVE INSUFFICIENCY: Primary | ICD-10-CM

## 2025-07-18 ENCOUNTER — TRANSCRIBE ORDERS (OUTPATIENT)
Dept: ADMINISTRATIVE | Facility: HOSPITAL | Age: 65
End: 2025-07-18
Payer: MEDICARE

## 2025-07-18 DIAGNOSIS — I34.0: Primary | ICD-10-CM

## 2025-08-11 ENCOUNTER — HOSPITAL ENCOUNTER (OUTPATIENT)
Dept: CARDIOLOGY | Facility: HOSPITAL | Age: 65
Discharge: HOME | End: 2025-08-11
Payer: MEDICARE

## 2025-08-11 ENCOUNTER — HOSPITAL ENCOUNTER (OUTPATIENT)
Dept: ULTRASOUND IMAGING | Facility: HOSPITAL | Age: 65
Discharge: HOME | End: 2025-08-11
Payer: MEDICARE

## 2025-08-11 DIAGNOSIS — I34.0 NONRHEUMATIC MITRAL VALVE INSUFFICIENCY: ICD-10-CM

## 2025-08-11 DIAGNOSIS — I34.0: ICD-10-CM

## 2025-08-11 PROCEDURE — 93306 TTE W/DOPPLER COMPLETE: CPT | Performed by: INTERNAL MEDICINE

## 2025-08-11 PROCEDURE — 93306 TTE W/DOPPLER COMPLETE: CPT

## 2025-08-11 PROCEDURE — 76705 ECHO EXAM OF ABDOMEN: CPT | Performed by: RADIOLOGY

## 2025-08-11 PROCEDURE — 76705 ECHO EXAM OF ABDOMEN: CPT

## 2025-08-12 LAB
AORTIC DIMENSIONLESS INDEX: 0.78 (DI)
AV MEAN PRESS GRAD SYS DOP V1V2: 3 MMHG
AV VMAX SYS DOP: 108 CM/SEC
BH CV ECHO MEAS - ACS: 1.7 CM
BH CV ECHO MEAS - AO MAX PG: 4.7 MMHG
BH CV ECHO MEAS - AO ROOT DIAM: 3.1 CM
BH CV ECHO MEAS - AO V2 VTI: 22.7 CM
BH CV ECHO MEAS - AVA(I,D): 2.46 CM2
BH CV ECHO MEAS - EDV(CUBED): 64 ML
BH CV ECHO MEAS - EDV(MOD-SP2): 45.9 ML
BH CV ECHO MEAS - EDV(MOD-SP4): 41.5 ML
BH CV ECHO MEAS - EF(MOD-SP2): 52.7 %
BH CV ECHO MEAS - EF(MOD-SP4): 59.3 %
BH CV ECHO MEAS - ESV(CUBED): 4.9 ML
BH CV ECHO MEAS - ESV(MOD-SP2): 21.7 ML
BH CV ECHO MEAS - ESV(MOD-SP4): 16.9 ML
BH CV ECHO MEAS - FS: 57.5 %
BH CV ECHO MEAS - IVS/LVPW: 1.11 CM
BH CV ECHO MEAS - IVSD: 1 CM
BH CV ECHO MEAS - LA DIMENSION: 3.1 CM
BH CV ECHO MEAS - LV DIASTOLIC VOL/BSA (35-75): 23.1 CM2
BH CV ECHO MEAS - LV MASS(C)D: 118.2 GRAMS
BH CV ECHO MEAS - LV MAX PG: 2.8 MMHG
BH CV ECHO MEAS - LV MEAN PG: 1 MMHG
BH CV ECHO MEAS - LV SYSTOLIC VOL/BSA (12-30): 9.4 CM2
BH CV ECHO MEAS - LV V1 MAX: 83.6 CM/SEC
BH CV ECHO MEAS - LV V1 VTI: 17.8 CM
BH CV ECHO MEAS - LVIDD: 4 CM
BH CV ECHO MEAS - LVIDS: 1.7 CM
BH CV ECHO MEAS - LVOT AREA: 3.1 CM2
BH CV ECHO MEAS - LVOT DIAM: 2 CM
BH CV ECHO MEAS - LVPWD: 0.9 CM
BH CV ECHO MEAS - MV A MAX VEL: 69 CM/SEC
BH CV ECHO MEAS - MV E MAX VEL: 68.6 CM/SEC
BH CV ECHO MEAS - MV E/A: 0.99
BH CV ECHO MEAS - PA ACC TIME: 0.1 SEC
BH CV ECHO MEAS - PA V2 MAX: 88.3 CM/SEC
BH CV ECHO MEAS - RVDD: 3.3 CM
BH CV ECHO MEAS - SV(LVOT): 55.9 ML
BH CV ECHO MEAS - SV(MOD-SP2): 24.2 ML
BH CV ECHO MEAS - SV(MOD-SP4): 24.6 ML
BH CV ECHO MEAS - SVI(LVOT): 31.2 ML/M2
BH CV ECHO MEAS - SVI(MOD-SP2): 13.5 ML/M2
BH CV ECHO MEAS - SVI(MOD-SP4): 13.7 ML/M2
BH CV ECHO MEAS - TAPSE (>1.6): 1.9 CM
BH CV ECHO MEAS - TR MAX PG: 15.1 MMHG
BH CV ECHO MEAS - TR MAX VEL: 194 CM/SEC
BH CV XLRA - RV BASE: 2.6 CM
BH CV XLRA - RV LENGTH: 5.3 CM
BH CV XLRA - RV MID: 2.6 CM
LEFT ATRIUM VOLUME INDEX: 13 ML/M2
LV EF BIPLANE MOD: 55.2 %